# Patient Record
Sex: MALE | Race: WHITE | NOT HISPANIC OR LATINO | URBAN - METROPOLITAN AREA
[De-identification: names, ages, dates, MRNs, and addresses within clinical notes are randomized per-mention and may not be internally consistent; named-entity substitution may affect disease eponyms.]

---

## 2021-11-29 ENCOUNTER — INPATIENT (INPATIENT)
Facility: HOSPITAL | Age: 86
LOS: 2 days | Discharge: HOME HEALTH | DRG: 605 | End: 2021-12-02
Attending: INTERNAL MEDICINE | Admitting: INTERNAL MEDICINE
Payer: MEDICARE

## 2021-11-29 VITALS
OXYGEN SATURATION: 94 % | SYSTOLIC BLOOD PRESSURE: 160 MMHG | DIASTOLIC BLOOD PRESSURE: 89 MMHG | HEIGHT: 67 IN | HEART RATE: 70 BPM | WEIGHT: 149.91 LBS | RESPIRATION RATE: 16 BRPM

## 2021-11-29 DIAGNOSIS — D72.829 ELEVATED WHITE BLOOD CELL COUNT, UNSPECIFIED: ICD-10-CM

## 2021-11-29 DIAGNOSIS — Z95.0 PRESENCE OF CARDIAC PACEMAKER: ICD-10-CM

## 2021-11-29 DIAGNOSIS — R55 SYNCOPE AND COLLAPSE: ICD-10-CM

## 2021-11-29 DIAGNOSIS — I10 ESSENTIAL (PRIMARY) HYPERTENSION: ICD-10-CM
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
## 2021-11-29 DIAGNOSIS — N40.0 BENIGN PROSTATIC HYPERPLASIA WITHOUT LOWER URINARY TRACT SYMPTOMS: ICD-10-CM

## 2021-11-29 DIAGNOSIS — I25.10 ATHEROSCLEROTIC HEART DISEASE OF NATIVE CORONARY ARTERY WITHOUT ANGINA PECTORIS: ICD-10-CM

## 2021-11-29 LAB
ANISOCYTOSIS BLD QL: SLIGHT — SIGNIFICANT CHANGE UP
APPEARANCE UR: CLEAR — SIGNIFICANT CHANGE UP
BACTERIA # UR AUTO: NEGATIVE — SIGNIFICANT CHANGE UP
BASOPHILS # BLD AUTO: 0 K/UL — SIGNIFICANT CHANGE UP (ref 0–0.2)
BASOPHILS NFR BLD AUTO: 0 % — SIGNIFICANT CHANGE UP (ref 0–2)
BILIRUB UR-MCNC: NEGATIVE — SIGNIFICANT CHANGE UP
CK SERPL-CCNC: 115 U/L — SIGNIFICANT CHANGE UP (ref 30–200)
COLOR SPEC: SIGNIFICANT CHANGE UP
DACRYOCYTES BLD QL SMEAR: SLIGHT — SIGNIFICANT CHANGE UP
DIFF PNL FLD: NEGATIVE — SIGNIFICANT CHANGE UP
EOSINOPHIL # BLD AUTO: 0.25 K/UL — SIGNIFICANT CHANGE UP (ref 0–0.5)
EOSINOPHIL NFR BLD AUTO: 0.9 % — SIGNIFICANT CHANGE UP (ref 0–6)
EPI CELLS # UR: 0 /HPF — SIGNIFICANT CHANGE UP
GLUCOSE UR QL: NEGATIVE — SIGNIFICANT CHANGE UP
HCT VFR BLD CALC: 42.9 % — SIGNIFICANT CHANGE UP (ref 39–50)
HGB BLD-MCNC: 13.7 G/DL — SIGNIFICANT CHANGE UP (ref 13–17)
HYALINE CASTS # UR AUTO: 0 /LPF — SIGNIFICANT CHANGE UP (ref 0–2)
KETONES UR-MCNC: NEGATIVE — SIGNIFICANT CHANGE UP
LEUKOCYTE ESTERASE UR-ACNC: NEGATIVE — SIGNIFICANT CHANGE UP
LYMPHOCYTES # BLD AUTO: 0.72 K/UL — LOW (ref 1–3.3)
LYMPHOCYTES # BLD AUTO: 2.6 % — LOW (ref 13–44)
MANUAL SMEAR VERIFICATION: SIGNIFICANT CHANGE UP
MCHC RBC-ENTMCNC: 28.5 PG — SIGNIFICANT CHANGE UP (ref 27–34)
MCHC RBC-ENTMCNC: 31.9 GM/DL — LOW (ref 32–36)
MCV RBC AUTO: 89.2 FL — SIGNIFICANT CHANGE UP (ref 80–100)
MICROCYTES BLD QL: SLIGHT — SIGNIFICANT CHANGE UP
MONOCYTES # BLD AUTO: 0.25 K/UL — SIGNIFICANT CHANGE UP (ref 0–0.9)
MONOCYTES NFR BLD AUTO: 0.9 % — LOW (ref 2–14)
NEUTROPHILS # BLD AUTO: 26.5 K/UL — HIGH (ref 1.8–7.4)
NEUTROPHILS NFR BLD AUTO: 95.6 % — HIGH (ref 43–77)
NITRITE UR-MCNC: NEGATIVE — SIGNIFICANT CHANGE UP
OVALOCYTES BLD QL SMEAR: SLIGHT — SIGNIFICANT CHANGE UP
PH UR: 7 — SIGNIFICANT CHANGE UP (ref 5–8)
PLAT MORPH BLD: NORMAL — SIGNIFICANT CHANGE UP
PLATELET # BLD AUTO: 564 K/UL — HIGH (ref 150–400)
POIKILOCYTOSIS BLD QL AUTO: SLIGHT — SIGNIFICANT CHANGE UP
POLYCHROMASIA BLD QL SMEAR: SLIGHT — SIGNIFICANT CHANGE UP
PROT UR-MCNC: SIGNIFICANT CHANGE UP
RBC # BLD: 4.81 M/UL — SIGNIFICANT CHANGE UP (ref 4.2–5.8)
RBC # FLD: 16.7 % — HIGH (ref 10.3–14.5)
RBC BLD AUTO: ABNORMAL
RBC CASTS # UR COMP ASSIST: 1 /HPF — SIGNIFICANT CHANGE UP (ref 0–4)
SARS-COV-2 RNA SPEC QL NAA+PROBE: SIGNIFICANT CHANGE UP
SP GR SPEC: 1.02 — SIGNIFICANT CHANGE UP (ref 1.01–1.02)
TROPONIN T, HIGH SENSITIVITY RESULT: 21 NG/L — SIGNIFICANT CHANGE UP (ref 0–51)
UROBILINOGEN FLD QL: NEGATIVE — SIGNIFICANT CHANGE UP
WBC # BLD: 27.72 K/UL — HIGH (ref 3.8–10.5)
WBC # FLD AUTO: 27.72 K/UL — HIGH (ref 3.8–10.5)
WBC UR QL: 0 /HPF — SIGNIFICANT CHANGE UP (ref 0–5)

## 2021-11-29 PROCEDURE — 71045 X-RAY EXAM CHEST 1 VIEW: CPT | Mod: 26

## 2021-11-29 PROCEDURE — 99285 EMERGENCY DEPT VISIT HI MDM: CPT

## 2021-11-29 PROCEDURE — 72125 CT NECK SPINE W/O DYE: CPT | Mod: 26,MA

## 2021-11-29 PROCEDURE — 70450 CT HEAD/BRAIN W/O DYE: CPT | Mod: 26,MA

## 2021-11-29 PROCEDURE — 72192 CT PELVIS W/O DYE: CPT | Mod: 26,MA

## 2021-11-29 PROCEDURE — 73552 X-RAY EXAM OF FEMUR 2/>: CPT | Mod: 26,RT

## 2021-11-29 PROCEDURE — 93010 ELECTROCARDIOGRAM REPORT: CPT

## 2021-11-29 PROCEDURE — 76377 3D RENDER W/INTRP POSTPROCES: CPT | Mod: 26

## 2021-11-29 PROCEDURE — 73562 X-RAY EXAM OF KNEE 3: CPT | Mod: 26,50

## 2021-11-29 PROCEDURE — 73502 X-RAY EXAM HIP UNI 2-3 VIEWS: CPT | Mod: 26,RT

## 2021-11-29 RX ORDER — SODIUM CHLORIDE 9 MG/ML
500 INJECTION INTRAMUSCULAR; INTRAVENOUS; SUBCUTANEOUS ONCE
Refills: 0 | Status: COMPLETED | OUTPATIENT
Start: 2021-11-29 | End: 2021-11-29

## 2021-11-29 RX ORDER — AMPICILLIN SODIUM AND SULBACTAM SODIUM 250; 125 MG/ML; MG/ML
1.5 INJECTION, POWDER, FOR SUSPENSION INTRAMUSCULAR; INTRAVENOUS ONCE
Refills: 0 | Status: COMPLETED | OUTPATIENT
Start: 2021-11-29 | End: 2021-11-29

## 2021-11-29 RX ORDER — ATORVASTATIN CALCIUM 80 MG/1
10 TABLET, FILM COATED ORAL AT BEDTIME
Refills: 0 | Status: DISCONTINUED | OUTPATIENT
Start: 2021-11-29 | End: 2021-12-02

## 2021-11-29 RX ORDER — ASPIRIN/CALCIUM CARB/MAGNESIUM 324 MG
81 TABLET ORAL DAILY
Refills: 0 | Status: DISCONTINUED | OUTPATIENT
Start: 2021-11-29 | End: 2021-12-02

## 2021-11-29 RX ORDER — AMLODIPINE BESYLATE 2.5 MG/1
5 TABLET ORAL DAILY
Refills: 0 | Status: DISCONTINUED | OUTPATIENT
Start: 2021-11-29 | End: 2021-12-02

## 2021-11-29 RX ORDER — FOLIC ACID 0.8 MG
0.5 TABLET ORAL
Qty: 0 | Refills: 0 | DISCHARGE

## 2021-11-29 RX ORDER — ATENOLOL 25 MG/1
100 TABLET ORAL
Refills: 0 | Status: DISCONTINUED | OUTPATIENT
Start: 2021-11-29 | End: 2021-12-02

## 2021-11-29 RX ORDER — ACETAMINOPHEN 500 MG
650 TABLET ORAL ONCE
Refills: 0 | Status: COMPLETED | OUTPATIENT
Start: 2021-11-29 | End: 2021-11-29

## 2021-11-29 RX ORDER — AMLODIPINE BESYLATE 2.5 MG/1
1 TABLET ORAL
Qty: 0 | Refills: 0 | DISCHARGE

## 2021-11-29 RX ORDER — ATENOLOL 25 MG/1
1 TABLET ORAL
Qty: 0 | Refills: 0 | DISCHARGE

## 2021-11-29 RX ORDER — CHOLECALCIFEROL (VITAMIN D3) 125 MCG
1 CAPSULE ORAL
Qty: 0 | Refills: 0 | DISCHARGE

## 2021-11-29 RX ORDER — ASPIRIN/CALCIUM CARB/MAGNESIUM 324 MG
1 TABLET ORAL
Qty: 0 | Refills: 0 | DISCHARGE

## 2021-11-29 RX ADMIN — SODIUM CHLORIDE 500 MILLILITER(S): 9 INJECTION INTRAMUSCULAR; INTRAVENOUS; SUBCUTANEOUS at 09:54

## 2021-11-29 RX ADMIN — ATENOLOL 100 MILLIGRAM(S): 25 TABLET ORAL at 18:21

## 2021-11-29 RX ADMIN — AMLODIPINE BESYLATE 5 MILLIGRAM(S): 2.5 TABLET ORAL at 15:55

## 2021-11-29 RX ADMIN — Medication 650 MILLIGRAM(S): at 11:10

## 2021-11-29 RX ADMIN — ATORVASTATIN CALCIUM 10 MILLIGRAM(S): 80 TABLET, FILM COATED ORAL at 21:26

## 2021-11-29 RX ADMIN — Medication 650 MILLIGRAM(S): at 21:26

## 2021-11-29 RX ADMIN — AMPICILLIN SODIUM AND SULBACTAM SODIUM 200 GRAM(S): 250; 125 INJECTION, POWDER, FOR SUSPENSION INTRAMUSCULAR; INTRAVENOUS at 09:41

## 2021-11-29 RX ADMIN — Medication 650 MILLIGRAM(S): at 09:41

## 2021-11-29 NOTE — ED ADULT NURSE REASSESSMENT NOTE - NS ED NURSE REASSESS COMMENT FT1
post urinary bladder scanner 370cc noted CHER Deleon ( admitting team )made aware , estrella yuniergger stopped , temperature is 97.7 oral, Cher trevino

## 2021-11-29 NOTE — ED PROVIDER NOTE - OBJECTIVE STATEMENT
89 r/o m  pmh of HTN, BPH, prostate cancer, CABG, pace maker on asa p/w fall x earlier today. EMS from home c/o R hip , b/l knee pain, hematoma. bruising  . Pt states that this morning around 03:30am walked to the bathroom , sudden "lost balance" falling , striking against carpet floor, unable to get up from the floor, denies LOC, hitting the head, as per EMS they found him on the floor this am  patient currently aaox3, in c collar

## 2021-11-29 NOTE — ED PROVIDER NOTE - CARE PLAN
Principal Discharge DX:	Syncope  Secondary Diagnosis:	Pain of right hip   1 Principal Discharge DX:	Syncope  Secondary Diagnosis:	Pain of right hip  Secondary Diagnosis:	Preseptal cellulitis of right eye

## 2021-11-29 NOTE — H&P ADULT - HISTORY OF PRESENT ILLNESS
89 r/o m  pmh of HTN, BPH, prostate cancer, CABG, pace maker on asa p/w fall x earlier today. EMS from home c/o R hip , b/l knee pain, hematoma. bruising  . Pt states that this morning around 03:30am walked to the bathroom , sudden "lost balance" falling , striking against carpet floor, unable to get up from the floor, denies LOC, hitting the head, as per EMS they found him on the floor this am

## 2021-11-29 NOTE — ED PROVIDER NOTE - NS ED ROS FT
Constitutional: No fever, chills.  Eyes:  No visual changes  ENMT:  No neck pain  Cardiac:  No chest pain  Respiratory:  No cough, SOB  GI:  No nausea, vomiting, diarrhea, abdominal pain.  :  No dysuria, hematuria  MS:  No back pain.  Neuro:  No headache or lightheadedness  Skin: b/l knee bruising. right hip bruising

## 2021-11-29 NOTE — ED ADULT NURSE NOTE - NSIMPLEMENTINTERV_GEN_ALL_ED
Implemented All Fall with Harm Risk Interventions:  [place] to call system. Call bell, personal items and telephone within reach. Instruct patient to call for assistance. Room bathroom lighting operational. Non-slip footwear when patient is off stretcher. Physically safe environment: no spills, clutter or unnecessary equipment. Stretcher in lowest position, wheels locked, appropriate side rails in place. Provide visual cue, wrist band, yellow gown, etc. Monitor gait and stability. Monitor for mental status changes and reorient to person, place, and time. Review medications for side effects contributing to fall risk. Reinforce activity limits and safety measures with patient and family. Provide visual clues: red socks.

## 2021-11-29 NOTE — ED ADULT NURSE REASSESSMENT NOTE - NS ED NURSE REASSESS COMMENT FT1
pt urine 50cc , denies painful or burning urinations. , post urine bladder scanner w/ 355cc urine noted on the bladder. Will keep monitor pt urine 50cc , denies painful or burning urinations. , post urine bladder scanner w/ 355cc urine noted on the bladder. CHER Morillo ( addicting team)  made aware  Will keep monitor

## 2021-11-29 NOTE — ED ADULT NURSE NOTE - OBJECTIVE STATEMENT
89y/o male aaox3 hx Cabg 25 hear ago, pacemaker on ASA  x1/81mg daily , BPH  presents to the ED via EMS from home c/o R hip , b/l knee pain, hematoma. bruising  . Pt states that this morning around 0330  walked to the bathroom , sudden "lost balance" falling , striking against carpet floor, unable to get up from the floor, denies LOC, hitting the head, as per EMS they found him on the floor this am , On exam pt  denies fever, chills, n/v, weakness, abd pain, diarrhea/constipation, numbness/tingling, urinary symptoms (frequency chronic ) , in no respiratory distress, no CP,  Safety and comfort measures initiated- bed placed in lowest position and side rails raised. Pt oriented to call bell system. 89y/o male aaox3 hx Cabg 25 hear ago, pacemaker on ASA  x1/81mg daily , BPH  presents to the ED via EMS from home c/o R hip , b/l knee pain, hematoma. bruising  . Pt states that this morning around 0330  walked to the bathroom , sudden "lost balance" falling , striking against carpet floor, unable to get up from the floor, denies LOC, hitting the head, as per EMS they found him on the floor this am , Upon examination, R hip bruising, deformity, B/l knee deformity, bruising,   On exam pt  denies fever, chills, n/v, weakness, abd pain, diarrhea/constipation, numbness/tingling, urinary symptoms (frequency chronic ) , in no respiratory distress, no CP,  Safety and comfort measures initiated- bed placed in lowest position and side rails raised. Pt oriented to call bell system. 89y/o male aaox3 hx Cabg 25 hear ago, pacemaker on ASA  x1/81mg daily , BPH  presents to the ED ( w/c-collar placed by EMS)  via EMS from home c/o R hip , b/l knee pain, hematoma. bruising  . Pt states that this morning around 0330  walked to the bathroom , sudden "lost balance" falling , striking against carpet floor, unable to get up from the floor, denies LOC, hitting the head, as per EMS they found him on the floor this am , Upon examination, R hip bruising, deformity, B/l knee deformity, bruising,  periorbital R eye w/ swelling and redness (denies blurry vision, any changes on vision) On exam pt  denies fever, chills, n/v, weakness, abd pain, diarrhea/constipation, numbness/tingling, urinary symptoms (frequency chronic ) , in no respiratory distress, no CP,  Safety and comfort measures initiated- bed placed in lowest position and side rails raised. Pt oriented to call bell system.

## 2021-11-29 NOTE — ED PROVIDER NOTE - PROGRESS NOTE DETAILS
pt rectal temp 96.7   will send blood and urine culture -- already treating for preseptal celluliits -- significant leukocytosis but may be secondary to hemoconcentration will give fluid bolus -- plain xr no hip fx seen -- ct performed with large hematoma -- ct head and cspine neg - ccollar cleared w confrontational exam no ttp -  pt cannot ambulate will admit for cellulitis and r/o occult hip fx Mehreen Limon PGY-2 spoke with ortho, will eval patient for occult hip fx Mehreen Limon PGY-2 PMD Dr ROSELYN Ansari Mehreen Limon PGY-2 admission for preseptal cellulitis and occult hip fx

## 2021-11-29 NOTE — ED PROVIDER NOTE - PHYSICAL EXAMINATION
Constitutional: Well developed, well nourished. NAD  TRAUMA: ABC intact. GCS 15.  Head: Normocephalic, atraumatic. no contusions or abrasions   Eyes: PERRL. EOMI.   ENT: No nasal discharge. Mucous membranes moist.  Neck: Supple. Painless ROM. No midline tenderness, stepoffs. C collar in place   Cardiovascular: systolic murmur   Pulmonary: Normal respiratory rate and effort. Lungs clear to auscultation bilaterally. No wheezing, rales, or rhonchi.  CHEST: No chest wall tenderness, crepitus.  Abdominal: Soft. Nondistended. Nontender. No rebound, guarding, rigidity.  BACK: No midline T/L/S tenderness, stepoffs.   Extremities. Pelvis stable. large hematoma to right hip, b/l knee. ROM of knee and hip intact. DP pulses intact. sensation intact   Skin: No rashes, cyanosis, lacerations,   Neuro: AAOx3. Strength 5/5 in all extremities. Sensation intact throughout. No focal neurological deficits.  Psych: Normal mood. Normal affect. Constitutional: Well developed, well nourished. NAD  TRAUMA: ABC intact. GCS 15.  Head: Normocephalic, atraumatic. no contusions or abrasions   Eyes: PERRL. EOMI. +erythema inferiorly to right orbit, no warmth, ttp over facial bones   ENT: No nasal discharge. Mucous membranes moist.  Neck: Supple. Painless ROM. No midline tenderness, stepoffs. C collar in place   Cardiovascular: systolic murmur   Pulmonary: Normal respiratory rate and effort. Lungs clear to auscultation bilaterally. No wheezing, rales, or rhonchi.  CHEST: No chest wall tenderness, crepitus.  Abdominal: Soft. Nondistended. Nontender. No rebound, guarding, rigidity.  BACK: No midline T/L/S tenderness, stepoffs.   Extremities. Pelvis stable. large hematoma to right hip, b/l knee. ROM of knee and hip intact. DP pulses intact. sensation intact   Skin: No rashes, cyanosis, lacerations,   Neuro: AAOx3. Strength 5/5 in all extremities. Sensation intact throughout. No focal neurological deficits.  Psych: Normal mood. Normal affect.

## 2021-11-29 NOTE — ED ADULT NURSE REASSESSMENT NOTE - NS ED NURSE REASSESS COMMENT FT1
RN contacted NP (admitting team ) for dietary orders and explain about the bladder scanner that was done early

## 2021-11-29 NOTE — ED PROVIDER NOTE - CLINICAL SUMMARY MEDICAL DECISION MAKING FREE TEXT BOX
lorena 89 m with cabg ppm with syncope vs fall unsure of event would suggest syncope- unable to get up after fall and unsure of loc - gcs15 aox3 ncat swelling periorb on right doesn't appear traumatic no pain w eom, eomi , no double vison concern for preseptal cellulitis ,, in ccollar - abd soft pelv stable large hematoma rt hip - w ecchymosis but minimal pain w log roll and from -- no shortening or roational deform -- xray r/o fx -- bl patellar ttp w hematoma -- xray to exclude fx- nvc -- pt unable to ambulate if xray neg nevin edmond for occult fx velez lorena 89 m with cabg ppm with syncope vs fall unsure of event would suggest syncope- unable to get up after fall and unsure of loc - gcs15 aox3 ncat swelling periorb on right doesn't appear traumatic no pain w eom, eomi , no double vison concern for preseptal cellulitis ,, in ccollar - abd soft pelv stable large hematoma rt hip ( On ASA no AC)  - w ecchymosis but minimal pain w log roll and from -- no shortening or roational deform -- xray r/o fx -- bl patellar ttp w hematoma -- xray to exclude fx- nvc -- pt unable to ambulate if xray neg nevin edmond for occult fx velez lorena 89 m with cabg ppm with syncope vs fall unsure of event would suggest syncope- unable to get up after fall and unsure of loc - gcs15 aox3 ncat swelling periorb on right doesn't appear traumatic no pain w eom, eomi , no double vison concern for preseptal cellulitis ,, in ccollar - abd soft pelv stable large hematoma rt hip ( On ASA no AC)  - w ecchymosis but minimal pain w log roll and from -- no shortening or rotational deform -- xray r/o fx -- bl patellar ttp w hematoma -- xray to exclude fx- nvc -- pt unable to ambulate if xray neg nevin torresit for occult fx velez

## 2021-11-30 LAB
ANION GAP SERPL CALC-SCNC: 11 MMOL/L — SIGNIFICANT CHANGE UP (ref 5–17)
BUN SERPL-MCNC: 18 MG/DL — SIGNIFICANT CHANGE UP (ref 7–23)
CALCIUM SERPL-MCNC: 9 MG/DL — SIGNIFICANT CHANGE UP (ref 8.4–10.5)
CHLORIDE SERPL-SCNC: 101 MMOL/L — SIGNIFICANT CHANGE UP (ref 96–108)
CO2 SERPL-SCNC: 24 MMOL/L — SIGNIFICANT CHANGE UP (ref 22–31)
COVID-19 NUCLEOCAPSID GAM AB INTERP: NEGATIVE — SIGNIFICANT CHANGE UP
COVID-19 NUCLEOCAPSID TOTAL GAM ANTIBODY RESULT: 0.08 INDEX — SIGNIFICANT CHANGE UP
COVID-19 SPIKE DOMAIN AB INTERP: POSITIVE
COVID-19 SPIKE DOMAIN ANTIBODY RESULT: >250 U/ML — HIGH
CREAT SERPL-MCNC: 0.77 MG/DL — SIGNIFICANT CHANGE UP (ref 0.5–1.3)
CULTURE RESULTS: SIGNIFICANT CHANGE UP
GLUCOSE SERPL-MCNC: 126 MG/DL — HIGH (ref 70–99)
HCT VFR BLD CALC: 36.4 % — LOW (ref 39–50)
HGB BLD-MCNC: 11.7 G/DL — LOW (ref 13–17)
MCHC RBC-ENTMCNC: 28.4 PG — SIGNIFICANT CHANGE UP (ref 27–34)
MCHC RBC-ENTMCNC: 32.1 GM/DL — SIGNIFICANT CHANGE UP (ref 32–36)
MCV RBC AUTO: 88.3 FL — SIGNIFICANT CHANGE UP (ref 80–100)
NRBC # BLD: 0 /100 WBCS — SIGNIFICANT CHANGE UP (ref 0–0)
PLATELET # BLD AUTO: 442 K/UL — HIGH (ref 150–400)
POTASSIUM SERPL-MCNC: 3.9 MMOL/L — SIGNIFICANT CHANGE UP (ref 3.5–5.3)
POTASSIUM SERPL-SCNC: 3.9 MMOL/L — SIGNIFICANT CHANGE UP (ref 3.5–5.3)
RBC # BLD: 4.12 M/UL — LOW (ref 4.2–5.8)
RBC # FLD: 16.8 % — HIGH (ref 10.3–14.5)
SARS-COV-2 IGG+IGM SERPL QL IA: 0.08 INDEX — SIGNIFICANT CHANGE UP
SARS-COV-2 IGG+IGM SERPL QL IA: >250 U/ML — HIGH
SARS-COV-2 IGG+IGM SERPL QL IA: NEGATIVE — SIGNIFICANT CHANGE UP
SARS-COV-2 IGG+IGM SERPL QL IA: POSITIVE
SODIUM SERPL-SCNC: 136 MMOL/L — SIGNIFICANT CHANGE UP (ref 135–145)
SPECIMEN SOURCE: SIGNIFICANT CHANGE UP
WBC # BLD: 12.37 K/UL — HIGH (ref 3.8–10.5)
WBC # FLD AUTO: 12.37 K/UL — HIGH (ref 3.8–10.5)

## 2021-11-30 RX ADMIN — Medication 81 MILLIGRAM(S): at 10:22

## 2021-11-30 RX ADMIN — ATORVASTATIN CALCIUM 10 MILLIGRAM(S): 80 TABLET, FILM COATED ORAL at 21:38

## 2021-11-30 NOTE — CONSULT NOTE ADULT - SUBJECTIVE AND OBJECTIVE BOX
Cardiovascular Disease Initial Evaluation    CHIEF COMPLAINT: Fall    HISTORY OF PRESENT ILLNESS: Mr. Dias is a 89 year old male with a past medical history of CAD s/p CABG, PPM placement, HTN, BPH, and prostate Ca who presents s.p fall on 11/29. Pt states that around 03:30am he walked to the bathroom , sudden "lost balance" falling , striking against carpet floor, unable to get up from the floor, denies LOC, hitting the head, as per EMS they found him on the floor this am. Pt denies chest pain or palpitations, syncope or presyncope.         Allergies    No Known Allergies    Intolerances    	    MEDICATIONS:  amLODIPine   Tablet 5 milliGRAM(s) Oral daily  aspirin enteric coated 81 milliGRAM(s) Oral daily  ATENolol  Tablet 100 milliGRAM(s) Oral two times a day            atorvastatin 10 milliGRAM(s) Oral at bedtime        PAST MEDICAL & SURGICAL HISTORY:  Essential hypertension    No significant past surgical history        FAMILY HISTORY:      SOCIAL HISTORY:    The patient is a nonsmoker       REVIEW OF SYSTEMS:  See HPI, otherwise complete 14 point review of systems negative    [ ] All others negative	  [ ] Unable to obtain    PHYSICAL EXAM:  T(C): 36.3 (11-30-21 @ 04:58), Max: 36.6 (11-29-21 @ 22:13)  HR: 70 (11-30-21 @ 04:58) (70 - 70)  BP: 120/69 (11-30-21 @ 04:58) (117/62 - 152/77)  RR: 18 (11-30-21 @ 04:58) (16 - 20)  SpO2: 93% (11-30-21 @ 04:58) (93% - 100%)  Wt(kg): --  I&O's Summary    29 Nov 2021 07:01  -  30 Nov 2021 07:00  --------------------------------------------------------  IN: 0 mL / OUT: 150 mL / NET: -150 mL    30 Nov 2021 07:01  -  30 Nov 2021 09:34  --------------------------------------------------------  IN: 0 mL / OUT: 200 mL / NET: -200 mL        Appearance: No Acute Distress; resting comfortably  HEENT:  Normal oral mucosa, PERRL, EOMI	  Cardiovascular: Normal S1 S2, No JVD, No murmurs/rubs/gallops  Respiratory: Normal respiratory effort; Lungs clear to auscultation bilaterally  Gastrointestinal:  Soft, Non-tender, + BS	  Skin: No rashes, No ecchymoses, No cyanosis	  Neurologic: Non-focal; no weakness  Extremities: No clubbing, cyanosis or edema  Vascular: Peripheral pulses palpable 2+ bilaterally  Psychiatry: A & O x 3, Mood & affect appropriate    Laboratory Data:	 	    CBC Full  -  ( 29 Nov 2021 08:55 )  WBC Count : 27.72 K/uL  Hemoglobin : 13.7 g/dL  Hematocrit : 42.9 %  Platelet Count - Automated : 564 K/uL  Mean Cell Volume : 89.2 fl  Mean Cell Hemoglobin : 28.5 pg  Mean Cell Hemoglobin Concentration : 31.9 gm/dL  Auto Neutrophil # : 26.50 K/uL  Auto Lymphocyte # : 0.72 K/uL  Auto Monocyte # : 0.25 K/uL  Auto Eosinophil # : 0.25 K/uL  Auto Basophil # : 0.00 K/uL  Auto Neutrophil % : 95.6 %  Auto Lymphocyte % : 2.6 %  Auto Monocyte % : 0.9 %  Auto Eosinophil % : 0.9 %  Auto Basophil % : 0.0 %    11-29    136  |  102  |  17  ----------------------------<  121<H>  4.8   |  19<L>  |  0.69    Ca    9.4      29 Nov 2021 08:55    TPro  6.7  /  Alb  4.4  /  TBili  0.8  /  DBili  x   /  AST  31  /  ALT  11  /  AlkPhos  64  11-29        Interpretation of Telemetry: 	    ECG:  Atrial synchronous ventricular paced rhythm. No changes from previous studies.   RADIOLOGY: CXR: normal with no fractures  OTHER:  XR pelvis: no fracture: hematoma of Lateral R femoral diaphysis    PREVIOUS DIAGNOSTIC TESTING:    [ ] Echocardiogram: N/a  [ ] Catheterization: N/a  [ ] Stress Test:  	N/a    Assessment:  89 year old male with a past medical history of CAD s/p CABG, PPM placement, HTN, BPH, and prostate Ca who presents s.p fall on 11/29.    Plan of Care:    #CAD s/p CABG  Continue ASA, BB and statin  No signs of coronary ischemia on exam  Obtain TTE    #Fall  Likely mechanical as per HPI  Imaging with no fractures  Management as per primary team  PT eval    #HTN  BP Acceptable  Continue Norvasc and BB    #HLD  Statin therapy    #ACP (advance care planning)-  Advanced care planning was discussed with the patient.  At this time, Mr. Dias is doing well cardiac wise. Will continue with current management.  Risks, benefits and alternatives of medical treatment and procedures were discussed in detail and all questions were answered. 30 minutes spent addressing advance care plans.        72 minutes spent on total encounter; more than 50% of the visit was spent counseling and/or coordinating care by the attending physician.   	  Mahin Weaver D.O.   Cardiovascular Diseases  [phone]

## 2021-11-30 NOTE — PHYSICAL THERAPY INITIAL EVALUATION ADULT - ASR WT BEARING STATUS EVAL
XRAY PELVIS: No acute fracture is identified. There is bilateral hip chondrocalcinosis with mild spurring. There are degenerative changes of the lower lumbar spine. XRAY RIGHT HIP/FEMUR: There is no acute fracture or dislocation. There is mild spurring at the greater trochanter and lesser trochanter. XRAY RIGHT KNEE: There is no knee joint effusion. Chondrocalcinosis is noted. There is mild spurring at the quadriceps insertion. There are posterior medial surgical clips. XRAY LEFT KNEE: There is no knee joint effusion. There is mild spurring at the quadriceps insertion. Chondrocalcinosis is noted. There is soft tissue swelling greatest laterally. CT CERVICAL SPINE: Multilevel chronic degenerative changes. No acute fracture identified. CT HEAD: 1)  moderate volume loss with scattered chronic ischemic changes. No acute abnormality suggested../no weight-bearing restrictions

## 2021-11-30 NOTE — PHYSICAL THERAPY INITIAL EVALUATION ADULT - PERTINENT HX OF CURRENT PROBLEM, REHAB EVAL
89 r/o m  pmh of HTN, BPH, prostate cancer, CABG, pace maker on asa p/w fall x earlier today. EMS from home c/o R hip , b/l knee pain, hematoma. bruising  . Pt states that this morning around 03:30am walked to the bathroom , sudden "lost balance" falling , striking against carpet floor, unable to get up from the floor, denies LOC, hitting the head, as per EMS they found him on the floor.

## 2021-11-30 NOTE — PHYSICAL THERAPY INITIAL EVALUATION ADULT - ADL SKILLS, REHAB EVAL
Anesthetic History   No history of anesthetic complications            Review of Systems / Medical History  Patient summary reviewed, nursing notes reviewed and pertinent labs reviewed    Pulmonary  Within defined limits                 Neuro/Psych         Psychiatric history and dementia     Cardiovascular    Hypertension  Valvular problems/murmurs: mitral insufficiency            Exercise tolerance: <4 METS  Comments: MVP-moderate  Mod pulm HTN    BB given today   GI/Hepatic/Renal     GERD           Endo/Other        Arthritis     Other Findings   Comments: Entire Left leg DVT  anemia         Physical Exam    Airway    TM Distance: 4 - 6 cm        Comments: Unable to examine airway as pt in unable to follow commands due to severe dementia. Cardiovascular    Rhythm: regular  Rate: normal    Murmur: Grade 2     Dental  No notable dental hx       Pulmonary  Breath sounds clear to auscultation               Abdominal  GI exam deferred       Other Findings            Anesthetic Plan    ASA: 3  Anesthesia type: general            Anesthetic plan and risks discussed with: Patient, Son / Daughter and Family      Pt at very high risk due to extensive DVT and risk of PE. Family aware and IVC/anticoag not provided at family/pt request despite increasing perioperative risk by not having IVC filter placed. All parties aware of risk and wish to proceed with surgery for palliative pain purposes.
needed assist

## 2021-11-30 NOTE — PHYSICAL THERAPY INITIAL EVALUATION ADULT - GAIT DEVIATIONS NOTED, PT EVAL
decreased mauro/increased time in double stance/decreased step length/decreased stride length/decreased weight-shifting ability

## 2021-11-30 NOTE — PROGRESS NOTE ADULT - ASSESSMENT
89 r/o m  pmh of HTN, BPH, prostate cancer, CABG, pace maker on asa p/w fall x earlier today. EMS from home c/o R hip , b/l knee pain, hematoma. bruising  . Pt states that this morning around 03:30am walked to the bathroom , sudden "lost balance" falling , striking against carpet floor, unable to get up from the floor, denies LOC, hitting the head, as per EMS they found him on the floor this am     Problem/Plan - 1:  ·  Problem: Fall.   ·  Plan: with Hip  and knee pain.   CT scans and X Rays  noted.     Problem/Plan - 2:  ·  Problem: Leukocytosis (leucocytosis).   ·  Plan: Resolving.      No evidence of acute infection . Cultures pending.     Problem/Plan - 3:  ·  Problem: Hypertension.   ·  Plan: BP meds with hold parameters.     Problem/Plan - 4:  ·  Problem: CAD in native artery.   ·  Plan: ASA,BB and Statin .     Problem/Plan - 5:  ·  Problem: BPH (benign prostatic hyperplasia).   ·  Plan: with prostrate cancer so outpt follow up.     Problem/Plan - 6:  ·  Problem: Cardiac pacemaker.   ·  Plan: Will interrogate. Cards consulted.

## 2021-11-30 NOTE — PHYSICAL THERAPY INITIAL EVALUATION ADULT - ADDITIONAL COMMENTS
[facility] and [facility], [place] [facility]. Has 1 step to enter (south entrance) and +elevator inside. Pt reports being Independent with functional mobility and amb short distances with rollator. Pt uses scooter for longer distances. Nurse reports pt able to get in/out of scooter without assist and requests help when needed. Has aide 7-10am everyday who assist with showering and ADLs as needed. Pt owns rollator, scooter, and has grab bars by toilet. Reports "my  friends help walk with me if I need help". Per pt and confirmed with [facility] at [facility] ([phone]), [place] lives in [facility] [employer]. Has 1 step to enter (south entrance) and +elevator inside. Pt reports being Independent with functional mobility and amb short distances with rollator. Pt uses scooter for longer distances. Nurse reports pt able to get in/out of scooter without assist and requests help when needed. Has aide 7-10am everyday who assist with showering and ADLs as needed. Pt owns rollator, scooter, and has grab bars by toilet. Reports "my  friends help walk with me if I need help".

## 2021-12-01 LAB
ANION GAP SERPL CALC-SCNC: 12 MMOL/L — SIGNIFICANT CHANGE UP (ref 5–17)
BUN SERPL-MCNC: 16 MG/DL — SIGNIFICANT CHANGE UP (ref 7–23)
CALCIUM SERPL-MCNC: 8.9 MG/DL — SIGNIFICANT CHANGE UP (ref 8.4–10.5)
CHLORIDE SERPL-SCNC: 103 MMOL/L — SIGNIFICANT CHANGE UP (ref 96–108)
CO2 SERPL-SCNC: 21 MMOL/L — LOW (ref 22–31)
CREAT SERPL-MCNC: 0.74 MG/DL — SIGNIFICANT CHANGE UP (ref 0.5–1.3)
GLUCOSE SERPL-MCNC: 99 MG/DL — SIGNIFICANT CHANGE UP (ref 70–99)
HCT VFR BLD CALC: 34 % — LOW (ref 39–50)
HGB BLD-MCNC: 11.1 G/DL — LOW (ref 13–17)
MCHC RBC-ENTMCNC: 28.6 PG — SIGNIFICANT CHANGE UP (ref 27–34)
MCHC RBC-ENTMCNC: 32.6 GM/DL — SIGNIFICANT CHANGE UP (ref 32–36)
MCV RBC AUTO: 87.6 FL — SIGNIFICANT CHANGE UP (ref 80–100)
NRBC # BLD: 0 /100 WBCS — SIGNIFICANT CHANGE UP (ref 0–0)
PLATELET # BLD AUTO: 388 K/UL — SIGNIFICANT CHANGE UP (ref 150–400)
POTASSIUM SERPL-MCNC: 4.2 MMOL/L — SIGNIFICANT CHANGE UP (ref 3.5–5.3)
POTASSIUM SERPL-SCNC: 4.2 MMOL/L — SIGNIFICANT CHANGE UP (ref 3.5–5.3)
RBC # BLD: 3.88 M/UL — LOW (ref 4.2–5.8)
RBC # FLD: 16.6 % — HIGH (ref 10.3–14.5)
SODIUM SERPL-SCNC: 136 MMOL/L — SIGNIFICANT CHANGE UP (ref 135–145)
WBC # BLD: 10.81 K/UL — HIGH (ref 3.8–10.5)
WBC # FLD AUTO: 10.81 K/UL — HIGH (ref 3.8–10.5)

## 2021-12-01 PROCEDURE — 93306 TTE W/DOPPLER COMPLETE: CPT | Mod: 26

## 2021-12-01 RX ORDER — ATORVASTATIN CALCIUM 80 MG/1
1 TABLET, FILM COATED ORAL
Qty: 30 | Refills: 0
Start: 2021-12-01 | End: 2021-12-30

## 2021-12-01 RX ORDER — ATORVASTATIN CALCIUM 80 MG/1
1 TABLET, FILM COATED ORAL
Qty: 0 | Refills: 0 | DISCHARGE

## 2021-12-01 RX ORDER — MULTIVIT WITH MIN/MFOLATE/K2 340-15/3 G
0 POWDER (GRAM) ORAL
Qty: 0 | Refills: 0 | DISCHARGE

## 2021-12-01 RX ADMIN — ATENOLOL 100 MILLIGRAM(S): 25 TABLET ORAL at 17:57

## 2021-12-01 RX ADMIN — Medication 81 MILLIGRAM(S): at 12:44

## 2021-12-01 RX ADMIN — ATENOLOL 100 MILLIGRAM(S): 25 TABLET ORAL at 05:01

## 2021-12-01 RX ADMIN — AMLODIPINE BESYLATE 5 MILLIGRAM(S): 2.5 TABLET ORAL at 05:00

## 2021-12-01 NOTE — DISCHARGE NOTE PROVIDER - PROVIDER TOKENS
PROVIDER:[TOKEN:[[id number]],FOLLOWUP:[2 weeks]],PROVIDER:[TOKEN:[[id number]],FOLLOWUP:[2 weeks]]

## 2021-12-01 NOTE — PROGRESS NOTE ADULT - ASSESSMENT
89 r/o m  pmh of HTN, BPH, prostate cancer, CABG, pace maker on asa p/w fall x earlier today. EMS from home c/o R hip , b/l knee pain, hematoma. bruising  . Pt states that this morning around 03:30am walked to the bathroom , sudden "lost balance" falling , striking against carpet floor, unable to get up from the floor, denies LOC, hitting the head, as per EMS they found him on the floor this am     Problem/Plan - 1:  ·  Problem: Fall.   ·  Plan: with Hip  and knee pain.   CT scans and X Rays  noted.     Problem/Plan - 2:  ·  Problem: Leukocytosis (leucocytosis).   ·  Plan: Resolving.      No evidence of acute infection . Cultures pending.     Problem/Plan - 3:  ·  Problem: Hypertension.   ·  Plan: BP meds with hold parameters.     Problem/Plan - 4:  ·  Problem: CAD in native artery.   ·  Plan: ASA,BB and Statin .     Problem/Plan - 5:  ·  Problem: BPH (benign prostatic hyperplasia).   ·  Plan: with prostrate cancer so outpt follow up.     Problem/Plan - 6:  ·  Problem: Cardiac pacemaker.   ·  Plan: Will interrogate. Cards consulted.     89 r/o m  pmh of HTN, BPH, prostate cancer, CABG, pace maker on asa p/w fall x earlier today. EMS from home c/o R hip , b/l knee pain, hematoma. bruising  . Pt states that this morning around 03:30am walked to the bathroom , sudden "lost balance" falling , striking against carpet floor, unable to get up from the floor, denies LOC, hitting the head, as per EMS they found him on the floor this am     Problem/Plan - 1:  ·  Problem: Fall.   ·  Plan: with Hip  and knee pain.   CT scans and X Rays  noted.     Problem/Plan - 2:  ·  Problem: Leukocytosis (leucocytosis).   ·  Plan: Resolving.      No evidence of acute infection . Cultures NGTD .      Problem/Plan - 3:  ·  Problem: Hypertension.   ·  Plan: BP meds with hold parameters.     Problem/Plan - 4:  ·  Problem: CAD in native artery.   ·  Plan: ASA,BB and Statin .  TTE pending.      Problem/Plan - 5:  ·  Problem: BPH (benign prostatic hyperplasia).   ·  Plan: with prostrate cancer so outpt follow up.     Problem/Plan - 6:  ·  Problem: Cardiac pacemaker.   ·  Plan: Cards helping.     Disposition : DC planning pending normal TTE.

## 2021-12-01 NOTE — DISCHARGE NOTE PROVIDER - HOSPITAL COURSE
89 r/o m  pmh of HTN, BPH, prostate cancer, CABG, pace maker on asa p/w fall x earlier today. EMS from home c/o R hip , b/l knee pain, hematoma. bruising  . Pt states that this morning around 03:30am walked to the bathroom , sudden "lost balance" falling , striking against carpet floor, unable to get up from the floor, denies LOC, hitting the head, as per EMS they found him on the floor this am     Problem/Plan - 1:  ·  Problem: Fall.   ·  Plan: with Hip  and knee pain.   CT scans and X Rays  noted.     Problem/Plan - 2:  ·  Problem: Leukocytosis (leucocytosis).   ·  Plan: Resolving.      No evidence of acute infection . Cultures NGTD .      Problem/Plan - 3:  ·  Problem: Hypertension.   ·  Plan: BP meds with hold parameters.     Problem/Plan - 4:  ·  Problem: CAD in native artery.   ·  Plan: ASA,BB and Statin .  TTE pending.      Problem/Plan - 5:  ·  Problem: BPH (benign prostatic hyperplasia).   ·  Plan: with prostrate cancer so outpt follow up.     Problem/Plan - 6:  ·  Problem: Cardiac pacemaker.   ·  Plan: Cards helping.

## 2021-12-01 NOTE — DISCHARGE NOTE PROVIDER - NSDCMRMEDTOKEN_GEN_ALL_CORE_FT
Aspirin Enteric Coated 81 mg oral delayed release tablet: 1 tab(s) orally once a day  atenolol 100 mg oral tablet: 1 tab(s) orally 2 times a day  atorvastatin 10 mg oral tablet: 1 tab(s) orally once a day (at bedtime)  folic acid: 0.5 tab(s) orally once a day  Norvasc 5 mg oral tablet: 1 tab(s) orally once a day  Vitamin B: 1 tab(s) orally once a day  Vitamin D3: 1 tab(s) orally once a day

## 2021-12-01 NOTE — DISCHARGE NOTE PROVIDER - NSDCCPCAREPLAN_GEN_ALL_CORE_FT
PRINCIPAL DISCHARGE DIAGNOSIS  Diagnosis: Fall  Assessment and Plan of Treatment: CTH / Spine xr negative      SECONDARY DISCHARGE DIAGNOSES  Diagnosis: Leukocytosis (leucocytosis)  Assessment and Plan of Treatment: Continue to monitor off antibiotics    Diagnosis: Hypertension  Assessment and Plan of Treatment: Low salt diet  Activity as tolerated.  Take all medication as prescribed.  Follow up with your medical doctor for routine blood pressure monitoring at your next visit.  Notify your doctor if you have any of the following symptoms:   Dizziness, Lightheadedness, Blurry vision, Headache, Chest pain, Shortness of breath

## 2021-12-01 NOTE — DISCHARGE NOTE PROVIDER - CARE PROVIDER_API CALL
Mike Morales  INTERNAL MEDICINE  [address] [place] [address]  Phone: [phone]  Fax: [phone]  Follow Up Time: 2 weeks    Mahin Weaver (DO)  Cardiology  [phone] [place] [address]  Phone: [phone]  Follow Up Time: 2 weeks

## 2021-12-02 VITALS
HEART RATE: 68 BPM | OXYGEN SATURATION: 98 % | RESPIRATION RATE: 18 BRPM | TEMPERATURE: 99 F | SYSTOLIC BLOOD PRESSURE: 145 MMHG | DIASTOLIC BLOOD PRESSURE: 64 MMHG

## 2021-12-02 RX ADMIN — Medication 81 MILLIGRAM(S): at 11:38

## 2021-12-02 RX ADMIN — ATENOLOL 100 MILLIGRAM(S): 25 TABLET ORAL at 05:28

## 2021-12-02 RX ADMIN — AMLODIPINE BESYLATE 5 MILLIGRAM(S): 2.5 TABLET ORAL at 05:28

## 2021-12-02 NOTE — DISCHARGE NOTE NURSING/CASE MANAGEMENT/SOCIAL WORK - NSDCPEFALRISK_GEN_ALL_CORE
For information on Fall & Injury Prevention, visit: [email or web address] OR  [email or web address] OR  https://www.cdc.gov/steadi/patient.html

## 2021-12-02 NOTE — PROGRESS NOTE ADULT - SUBJECTIVE AND OBJECTIVE BOX
Cardiovascular Disease Progress Note    Overnight events: No acute events overnight.  Mr. Dias denies chest pain or SOB.  Otherwise review of systems negative    Objective Findings:  T(C): 36.5 (21 @ 04:33), Max: 36.7 (21 @ 12:52)  HR: 70 (21 @ 04:33) (69 - 70)  BP: 148/63 (21 @ 04:33) (135/70 - 150/68)  RR: 17 (21 @ 04:33) (16 - 18)  SpO2: 92% (21 @ 04:33) (92% - 100%)  Wt(kg): --  Daily     Daily Weight in k.2 (02 Dec 2021 07:30)      Physical Exam:  Gen: NAD; Patient resting comfortably  HEENT: EOMI, Normocephalic/ atraumatic  CV: RRR, normal S1 + S2, no m/r/g  Lungs:  Normal respiratory effort; clear to auscultation bilaterally  Abd: soft, non-tender; bowel sounds present  Ext: No edema; warm and well perfused    Telemetry: V paced rhythm    Laboratory Data:                        11.1   10.81 )-----------( 388      ( 01 Dec 2021 06:27 )             34.0         136  |  103  |  16  ----------------------------<  99  4.2   |  21<L>  |  0.74    Ca    8.9      01 Dec 2021 06:27                Inpatient Medications:  MEDICATIONS  (STANDING):  amLODIPine   Tablet 5 milliGRAM(s) Oral daily  aspirin enteric coated 81 milliGRAM(s) Oral daily  ATENolol  Tablet 100 milliGRAM(s) Oral two times a day  atorvastatin 10 milliGRAM(s) Oral at bedtime      Assessment: 89 year old male with a past medical history of CAD s/p CABG, PPM placement, HTN, BPH, and prostate Ca who presents s.p fall on .    Plan of Care:    #CAD s/p CABG  Continue ASA, BB and statin  No signs of coronary ischemia on exam  TTE shows Preserved LVEF with G3DD, moderate AS and mild MS. At this time, will continue to monitor and treat conservatively given advanced age.     #Fall  Likely mechanical as per HPI  Imaging with no fractures  Management as per primary team  PT eval      #HTN  BP Acceptable  Continue Norvasc and BB    #HLD  Statin therapy          Over 25 minutes spent on total encounter; more than 50% of the visit was spent counseling and/or coordinating care by the attending physician.      Mahin Weaver D.O.   Cardiovascular Disease  [phone]
Date of Service  : 21     INTERVAL HPI/OVERNIGHT EVENTS:  Seen and examined earlier today . I feel fine so want to go home.   Vital Signs Last 24 Hrs  T(C): 36.7 (01 Dec 2021 05:14), Max: 36.8 (2021 20:31)  T(F): 98 (01 Dec 2021 05:14), Max: 98.2 (2021 20:31)  HR: 70 (01 Dec 2021 05:14) (64 - 72)  BP: 145/65 (01 Dec 2021 05:14) (118/74 - 148/73)  BP(mean): --  RR: 18 (01 Dec 2021 05:14) (18 - 18)  SpO2: 100% (01 Dec 2021 05:14) (95% - 100%)  I&O's Summary    2021 07:01  -  01 Dec 2021 07:00  --------------------------------------------------------  IN: 600 mL / OUT: 800 mL / NET: -200 mL    01 Dec 2021 07:01  -  01 Dec 2021 11:23  --------------------------------------------------------  IN: 240 mL / OUT: 300 mL / NET: -60 mL      MEDICATIONS  (STANDING):  amLODIPine   Tablet 5 milliGRAM(s) Oral daily  aspirin enteric coated 81 milliGRAM(s) Oral daily  ATENolol  Tablet 100 milliGRAM(s) Oral two times a day  atorvastatin 10 milliGRAM(s) Oral at bedtime    MEDICATIONS  (PRN):    LABS:                        11.1   10.81 )-----------( 388      ( 01 Dec 2021 06:27 )             34.0     12    136  |  103  |  16  ----------------------------<  99  4.2   |  21<L>  |  0.74    Ca    8.9      01 Dec 2021 06:27        Urinalysis Basic - ( 2021 11:51 )    Color: Light Yellow / Appearance: Clear / S.017 / pH: x  Gluc: x / Ketone: Negative  / Bili: Negative / Urobili: Negative   Blood: x / Protein: Trace / Nitrite: Negative   Leuk Esterase: Negative / RBC: 1 /hpf / WBC 0 /HPF   Sq Epi: x / Non Sq Epi: 0 /hpf / Bacteria: Negative      CAPILLARY BLOOD GLUCOSE            Urinalysis Basic - ( 2021 11:51 )    Color: Light Yellow / Appearance: Clear / S.017 / pH: x  Gluc: x / Ketone: Negative  / Bili: Negative / Urobili: Negative   Blood: x / Protein: Trace / Nitrite: Negative   Leuk Esterase: Negative / RBC: 1 /hpf / WBC 0 /HPF   Sq Epi: x / Non Sq Epi: 0 /hpf / Bacteria: Negative      REVIEW OF SYSTEMS:  CONSTITUTIONAL: No fever, weight loss, or fatigue  EYES: No eye pain, visual disturbances, or discharge  ENMT:  No difficulty hearing, tinnitus, vertigo; No sinus or throat pain  NECK: No pain or stiffness  RESPIRATORY: No cough, wheezing, chills or hemoptysis; No shortness of breath  CARDIOVASCULAR: No chest pain, palpitations, dizziness, or leg swelling  GASTROINTESTINAL: No abdominal or epigastric pain. No nausea, vomiting, or hematemesis; No diarrhea or constipation. No melena or hematochezia.  GENITOURINARY: No dysuria, frequency, hematuria, or incontinence  NEUROLOGICAL: No headaches, memory loss, loss of strength, numbness, or tremors      Consultant(s) Notes Reviewed:  [x ] YES  [ ] NO    PHYSICAL EXAM:  GENERAL: NAD, well-groomed, well-developed ,not in any distress ,  HEAD:  Atraumatic, Normocephalic  EYES: EOMI, PERRLA, conjunctiva and sclera clear  ENMT: No tonsillar erythema, exudates, or enlargement; Moist mucous membranes, Good dentition, No lesions  NECK: Supple, No JVD, Normal thyroid  NERVOUS SYSTEM:  Alert & Oriented X3, No focal deficit   CHEST/LUNG: Good air entry bilateral with no  rales, rhonchi, wheezing, or rubs  HEART: Regular rate and rhythm; No murmurs, rubs, or gallops  ABDOMEN: Soft, Nontender, Nondistended; Bowel sounds present  EXTREMITIES:  2+ Peripheral Pulses, No clubbing, cyanosis, or edema    Care Discussed with Consultants/Other Providers [ x] YES  [ ] NO
Date of Service  : 21 @ 14:39    INTERVAL HPI/OVERNIGHT EVENTS: No new concerns. I feel great .   Vital Signs Last 24 Hrs  T(C): 36.5 (2021 13:45), Max: 36.6 (2021 22:13)  T(F): 97.7 (2021 13:45), Max: 97.8 (2021 22:13)  HR: 70 (2021 13:45) (70 - 70)  BP: 122/70 (2021 13:45) (120/69 - 152/77)  BP(mean): --  RR: 18 (2021 13:45) (16 - 20)  SpO2: 95% (2021 13:45) (93% - 97%)  I&O's Summary    2021 07:01  -  2021 07:00  --------------------------------------------------------  IN: 0 mL / OUT: 150 mL / NET: -150 mL    2021 07:01  -  2021 14:39  --------------------------------------------------------  IN: 360 mL / OUT: 400 mL / NET: -40 mL      MEDICATIONS  (STANDING):  amLODIPine   Tablet 5 milliGRAM(s) Oral daily  aspirin enteric coated 81 milliGRAM(s) Oral daily  ATENolol  Tablet 100 milliGRAM(s) Oral two times a day  atorvastatin 10 milliGRAM(s) Oral at bedtime    MEDICATIONS  (PRN):    LABS:                        11.7   12.37 )-----------( 442      ( 2021 09:25 )             36.4         136  |  101  |  18  ----------------------------<  126<H>  3.9   |  24  |  0.77    Ca    9.0      2021 09:25    TPro  6.7  /  Alb  4.4  /  TBili  0.8  /  DBili  x   /  AST  31  /  ALT  11  /  AlkPhos  64        Urinalysis Basic - ( 2021 11:51 )    Color: Light Yellow / Appearance: Clear / S.017 / pH: x  Gluc: x / Ketone: Negative  / Bili: Negative / Urobili: Negative   Blood: x / Protein: Trace / Nitrite: Negative   Leuk Esterase: Negative / RBC: 1 /hpf / WBC 0 /HPF   Sq Epi: x / Non Sq Epi: 0 /hpf / Bacteria: Negative      CAPILLARY BLOOD GLUCOSE            Urinalysis Basic - ( 2021 11:51 )    Color: Light Yellow / Appearance: Clear / S.017 / pH: x  Gluc: x / Ketone: Negative  / Bili: Negative / Urobili: Negative   Blood: x / Protein: Trace / Nitrite: Negative   Leuk Esterase: Negative / RBC: 1 /hpf / WBC 0 /HPF   Sq Epi: x / Non Sq Epi: 0 /hpf / Bacteria: Negative      REVIEW OF SYSTEMS:  CONSTITUTIONAL: No fever, weight loss, or fatigue  EYES: No eye pain, visual disturbances, or discharge  ENMT:  No difficulty hearing, tinnitus, vertigo; No sinus or throat pain  NECK: No pain or stiffness  RESPIRATORY: No cough, wheezing, chills or hemoptysis; No shortness of breath  CARDIOVASCULAR: No chest pain, palpitations, dizziness, or leg swelling  GASTROINTESTINAL: No abdominal or epigastric pain. No nausea, vomiting, or hematemesis; No diarrhea or constipation. No melena or hematochezia.  GENITOURINARY: No dysuria, frequency, hematuria, or incontinence  NEUROLOGICAL: No headaches, memory loss, loss of strength, numbness, or tremors      Consultant(s) Notes Reviewed:  [x ] YES  [ ] NO    PHYSICAL EXAM:  GENERAL: NAD, well-groomed, well-developed, not in any distress ,  HEAD:  Atraumatic, Normocephalic  EYES: EOMI, PERRLA, conjunctiva and sclera clear  ENMT: No tonsillar erythema, exudates, or enlargement; Moist mucous membranes, Good dentition, No lesions  NECK: Supple, No JVD, Normal thyroid  NERVOUS SYSTEM:  Alert & Oriented X3, No focal deficit   CHEST/LUNG: Good air entry bilateral with no  rales, rhonchi, wheezing, or rubs  HEART: Regular rate and rhythm; No murmurs, rubs, or gallops  ABDOMEN: Soft, Nontender, Nondistended; Bowel sounds present  EXTREMITIES:  2+ Peripheral Pulses, No clubbing, cyanosis, or edema  Hematoma Right hip and left knee.     Care Discussed with Consultants/Other Providers [ x] YES  [ ] NO
Cardiovascular Disease Progress Note    Overnight events: No acute events overnight.  Mr. Dias denies chest pain or SOB.   Otherwise review of systems negative    Objective Findings:  T(C): 36.7 (21 @ 05:14), Max: 36.8 (21 @ 20:31)  HR: 70 (21 @ 05:14) (64 - 72)  BP: 145/65 (21 @ 05:14) (118/74 - 148/73)  RR: 18 (21 @ 05:14) (18 - 18)  SpO2: 100% (21 @ 05:14) (95% - 100%)  Wt(kg): --  Daily     Daily Weight in k.9 (2021 08:09)      Physical Exam:  Gen: NAD; Patient resting comfortably  HEENT: EOMI, Normocephalic/ atraumatic  CV: RRR, normal S1 + S2, no m/r/g  Lungs:  Normal respiratory effort; clear to auscultation bilaterally  Abd: soft, non-tender; bowel sounds present  Ext: No edema; warm and well perfused    Telemetry: V paced rhythm    Laboratory Data:                        11.1   10.81 )-----------( 388      ( 01 Dec 2021 06:27 )             34.0         136  |  103  |  16  ----------------------------<  99  4.2   |  21<L>  |  0.74    Ca    8.9      01 Dec 2021 06:27    TPro  6.7  /  Alb  4.4  /  TBili  0.8  /  DBili  x   /  AST  31  /  ALT  11  /  AlkPhos  64        CARDIAC MARKERS ( 2021 08:55 )  x     / x     / 115 U/L / x     / x              Inpatient Medications:  MEDICATIONS  (STANDING):  amLODIPine   Tablet 5 milliGRAM(s) Oral daily  aspirin enteric coated 81 milliGRAM(s) Oral daily  ATENolol  Tablet 100 milliGRAM(s) Oral two times a day  atorvastatin 10 milliGRAM(s) Oral at bedtime      Assessment:  89 year old male with a past medical history of CAD s/p CABG, PPM placement, HTN, BPH, and prostate Ca who presents s.p fall on .    Plan of Care:    #CAD s/p CABG  Continue ASA, BB and statin  No signs of coronary ischemia on exam  Obtain TTE    #Fall  Likely mechanical as per HPI  Imaging with no fractures  Management as per primary team  PT eval      #HTN  BP Acceptable  Continue Norvasc and BB    #HLD  Statin therapy      Over 25 minutes spent on total encounter; more than 50% of the visit was spent counseling and/or coordinating care by the attending physician.      Mahin Weaver D.O.   Cardiovascular Disease  [phone]

## 2021-12-02 NOTE — DISCHARGE NOTE NURSING/CASE MANAGEMENT/SOCIAL WORK - PATIENT PORTAL LINK FT
You can access the FollowMyHealth Patient Portal offered by [facility] by registering at the following website: [email or web address]. By joining [facility]’s FollowMyHealth portal, you will also be able to view your health information using other applications (apps) compatible with our system.

## 2021-12-04 LAB
CULTURE RESULTS: SIGNIFICANT CHANGE UP
CULTURE RESULTS: SIGNIFICANT CHANGE UP
SPECIMEN SOURCE: SIGNIFICANT CHANGE UP
SPECIMEN SOURCE: SIGNIFICANT CHANGE UP

## 2021-12-17 NOTE — ED ADULT NURSE NOTE - HIV OFFER
Opt out
Bill For Surgical Tray: no
Expected Date Of Service: 12/17/2021
Billing Type: Third-Party Bill

## 2021-12-22 PROCEDURE — 73552 X-RAY EXAM OF FEMUR 2/>: CPT

## 2021-12-22 PROCEDURE — 81001 URINALYSIS AUTO W/SCOPE: CPT

## 2021-12-22 PROCEDURE — C8929: CPT

## 2021-12-22 PROCEDURE — 82550 ASSAY OF CK (CPK): CPT

## 2021-12-22 PROCEDURE — 73502 X-RAY EXAM HIP UNI 2-3 VIEWS: CPT

## 2021-12-22 PROCEDURE — 71045 X-RAY EXAM CHEST 1 VIEW: CPT

## 2021-12-22 PROCEDURE — 70450 CT HEAD/BRAIN W/O DYE: CPT | Mod: MA

## 2021-12-22 PROCEDURE — 36415 COLL VENOUS BLD VENIPUNCTURE: CPT

## 2021-12-22 PROCEDURE — U0005: CPT

## 2021-12-22 PROCEDURE — 86900 BLOOD TYPING SEROLOGIC ABO: CPT

## 2021-12-22 PROCEDURE — 80048 BASIC METABOLIC PNL TOTAL CA: CPT

## 2021-12-22 PROCEDURE — 87040 BLOOD CULTURE FOR BACTERIA: CPT

## 2021-12-22 PROCEDURE — 87086 URINE CULTURE/COLONY COUNT: CPT

## 2021-12-22 PROCEDURE — 73562 X-RAY EXAM OF KNEE 3: CPT

## 2021-12-22 PROCEDURE — 86769 SARS-COV-2 COVID-19 ANTIBODY: CPT

## 2021-12-22 PROCEDURE — 86850 RBC ANTIBODY SCREEN: CPT

## 2021-12-22 PROCEDURE — U0003: CPT

## 2021-12-22 PROCEDURE — 96374 THER/PROPH/DIAG INJ IV PUSH: CPT

## 2021-12-22 PROCEDURE — 99285 EMERGENCY DEPT VISIT HI MDM: CPT | Mod: 25

## 2021-12-22 PROCEDURE — 97161 PT EVAL LOW COMPLEX 20 MIN: CPT

## 2021-12-22 PROCEDURE — 84484 ASSAY OF TROPONIN QUANT: CPT

## 2021-12-22 PROCEDURE — 72125 CT NECK SPINE W/O DYE: CPT | Mod: MA

## 2021-12-22 PROCEDURE — 76377 3D RENDER W/INTRP POSTPROCES: CPT

## 2021-12-22 PROCEDURE — 80053 COMPREHEN METABOLIC PANEL: CPT

## 2021-12-22 PROCEDURE — 85025 COMPLETE CBC W/AUTO DIFF WBC: CPT

## 2021-12-22 PROCEDURE — 82962 GLUCOSE BLOOD TEST: CPT

## 2021-12-22 PROCEDURE — 72192 CT PELVIS W/O DYE: CPT | Mod: MA

## 2021-12-22 PROCEDURE — 86901 BLOOD TYPING SEROLOGIC RH(D): CPT

## 2021-12-22 PROCEDURE — 85027 COMPLETE CBC AUTOMATED: CPT
